# Patient Record
Sex: FEMALE | Race: WHITE | NOT HISPANIC OR LATINO | URBAN - METROPOLITAN AREA
[De-identification: names, ages, dates, MRNs, and addresses within clinical notes are randomized per-mention and may not be internally consistent; named-entity substitution may affect disease eponyms.]

---
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
## 2019-07-24 ENCOUNTER — OFFICE VISIT - RIVER FALLS (OUTPATIENT)
Dept: FAMILY MEDICINE | Facility: CLINIC | Age: 19
End: 2019-07-24

## 2019-07-24 ASSESSMENT — MIFFLIN-ST. JEOR: SCORE: 1485.03

## 2019-08-21 ENCOUNTER — OFFICE VISIT - RIVER FALLS (OUTPATIENT)
Dept: FAMILY MEDICINE | Facility: CLINIC | Age: 19
End: 2019-08-21

## 2019-08-21 ASSESSMENT — MIFFLIN-ST. JEOR: SCORE: 1483.21

## 2020-02-25 ENCOUNTER — OFFICE VISIT - RIVER FALLS (OUTPATIENT)
Dept: FAMILY MEDICINE | Facility: CLINIC | Age: 20
End: 2020-02-25

## 2020-03-05 ENCOUNTER — COMMUNICATION - RIVER FALLS (OUTPATIENT)
Dept: FAMILY MEDICINE | Facility: CLINIC | Age: 20
End: 2020-03-05

## 2020-03-10 ENCOUNTER — OFFICE VISIT - RIVER FALLS (OUTPATIENT)
Dept: FAMILY MEDICINE | Facility: CLINIC | Age: 20
End: 2020-03-10

## 2020-04-29 ENCOUNTER — OFFICE VISIT - RIVER FALLS (OUTPATIENT)
Dept: FAMILY MEDICINE | Facility: CLINIC | Age: 20
End: 2020-04-29

## 2020-10-06 ENCOUNTER — OFFICE VISIT - RIVER FALLS (OUTPATIENT)
Dept: FAMILY MEDICINE | Facility: CLINIC | Age: 20
End: 2020-10-06

## 2021-06-28 ENCOUNTER — OFFICE VISIT - RIVER FALLS (OUTPATIENT)
Dept: FAMILY MEDICINE | Facility: CLINIC | Age: 21
End: 2021-06-28

## 2021-06-28 ASSESSMENT — MIFFLIN-ST. JEOR: SCORE: 1497.73

## 2021-10-25 ENCOUNTER — OFFICE VISIT - RIVER FALLS (OUTPATIENT)
Dept: FAMILY MEDICINE | Facility: CLINIC | Age: 21
End: 2021-10-25

## 2021-10-25 ASSESSMENT — MIFFLIN-ST. JEOR: SCORE: 1513.15

## 2022-02-11 VITALS
HEART RATE: 75 BPM | SYSTOLIC BLOOD PRESSURE: 118 MMHG | OXYGEN SATURATION: 99 % | DIASTOLIC BLOOD PRESSURE: 68 MMHG | WEIGHT: 165.2 LBS | TEMPERATURE: 98.7 F | BODY MASS INDEX: 26.55 KG/M2 | HEIGHT: 66 IN

## 2022-02-11 VITALS
HEART RATE: 68 BPM | HEIGHT: 66 IN | WEIGHT: 158.6 LBS | TEMPERATURE: 97.3 F | HEIGHT: 66 IN | TEMPERATURE: 96.1 F | BODY MASS INDEX: 25.55 KG/M2 | SYSTOLIC BLOOD PRESSURE: 112 MMHG | WEIGHT: 159 LBS | BODY MASS INDEX: 25.49 KG/M2 | HEART RATE: 64 BPM | DIASTOLIC BLOOD PRESSURE: 62 MMHG | SYSTOLIC BLOOD PRESSURE: 118 MMHG | DIASTOLIC BLOOD PRESSURE: 62 MMHG

## 2022-02-11 VITALS
OXYGEN SATURATION: 98 % | TEMPERATURE: 98 F | TEMPERATURE: 98 F | SYSTOLIC BLOOD PRESSURE: 106 MMHG | HEART RATE: 70 BPM | WEIGHT: 162.2 LBS | BODY MASS INDEX: 26.58 KG/M2 | SYSTOLIC BLOOD PRESSURE: 126 MMHG | OXYGEN SATURATION: 98 % | DIASTOLIC BLOOD PRESSURE: 66 MMHG | WEIGHT: 164.2 LBS | BODY MASS INDEX: 26.91 KG/M2 | HEART RATE: 76 BPM | DIASTOLIC BLOOD PRESSURE: 62 MMHG

## 2022-02-11 VITALS
WEIGHT: 152.4 LBS | HEART RATE: 88 BPM | BODY MASS INDEX: 24.49 KG/M2 | DIASTOLIC BLOOD PRESSURE: 76 MMHG | TEMPERATURE: 99.3 F | HEIGHT: 66 IN | SYSTOLIC BLOOD PRESSURE: 118 MMHG

## 2022-02-11 VITALS
TEMPERATURE: 97.4 F | OXYGEN SATURATION: 98 % | WEIGHT: 161.8 LBS | DIASTOLIC BLOOD PRESSURE: 64 MMHG | HEART RATE: 82 BPM | SYSTOLIC BLOOD PRESSURE: 108 MMHG | BODY MASS INDEX: 26 KG/M2 | HEIGHT: 66 IN

## 2022-02-16 NOTE — PROGRESS NOTES
Patient:   NAYANA MCCONNELL            MRN: [id number]            FIN: [id number]               Age:   20 years     Sex:  Female     :  2000   Associated Diagnoses:   BLAKE (generalized anxiety disorder); Severe major depression   Author:   Antonia Hunt      Visit Information      Date of Service: 03/10/2020 12:19 pm  Performing Location: [facility]  Encounter#: [id number]      Primary Care Provider (PCP):  NONE ,       Referring Provider:  Antonia Hunt    NPI# [id number]      Chief Complaint   3/10/2020 12:23 PM CDT   FMLA paperwork for mom in cause she needs to be with her daughter for depression/anxiety        History of Present Illness   here with mom, Geovanna, to complete FMLA paper work.  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 She saw me 2020 after her mom had gone out to [place] to be with her and bring her home. Mom missed a number of work days due to Nayana's risk of harm to herself and her employer advised that she have FMLA completed in case she needs additional time off.  Nayana is now back on medication , has doubled the dose I started her on and has additional increased dose for next month. She is starting to feel better, would call mom and seek friends if started to think again about hurting self. Nayana currently is living in , mom is in [place]    See copy of [place]      Health Status   Allergies:    Allergic Reactions (Selected)  No known allergies  No Known Medication Allergies   Medications:  (Selected)   Prescriptions  Prescribed  Lexapro 20 mg oral tablet: = 1 tab(s) ( 20 mg ), Oral, daily, Instructions: start after first month of starter dose, # 30 tab(s), 0 Refill(s), Type: Maintenance, Pharmacy: [facility] #[id number], 1 tab(s) Oral daily,Instr:start after first month of starter dose  escitalopram 10 mg oral tablet: See Instructions, Instructions:  TAKE ONE-HALF TABLET BY MOUTH EVERY DAY FOR SEVEN DAYS, THEN TAKE ONE TABLET BY MOUTH EVERY DAY., # 22 EA, 1 Refill(s), Type: Soft Stop, Pharmacy: [facility] #[id number], TAKE ONE-HALF TABLET BY MOUTH EVERY DAY FOR...  Documented Medications  Documented  Flovent HFA: inh, bid, 0 Refill(s), Type: Maintenance  albuterol: 0 Refill(s), Type: Maintenance   Problem list:    No problem items selected or recorded.      Histories   Past Medical History:    No active or resolved past medical history items have been selected or recorded.   Family History:    No family history items have been selected or recorded.   Procedure history:    No active procedure history items have been selected or recorded.   Social History:        Alcohol Assessment            Never      Tobacco Assessment            Never (less than 100 in lifetime)      Substance Abuse Assessment            Never      Nutrition and Health Assessment            Type of diet: Regular.      Exercise and Physical Activity Assessment            Exercise frequency: 3-4 times/week.  Exercise type: Running.      Sexual Assessment            Sexually active: No.  Identifies as female, Sexual orientation: Straight or heterosexual.        Physical Examination   Vital Signs   3/10/2020 12:23 PM CDT Temperature Tympanic 98.0 DegF    Peripheral Pulse Rate 76 bpm    Systolic Blood Pressure 106 mmHg    Diastolic Blood Pressure 62 mmHg    Mean Arterial Pressure 77 mmHg    BP Site Right arm    BP Method Manual    Oxygen Saturation 98 %      Measurements from flowsheet : Measurements   3/10/2020 12:23 PM CDT   Weight Measured - Standard                164.2 lb     General:  Alert and oriented, No acute distress, good eye contact, smiling.       Impression and Plan   Diagnosis     BLAKE (generalized anxiety disorder) (DDH38-GX F41.1).     Severe major depression (LOS94-BY F32.2).     Plan:  see me in 4 weeks, after increased dose of current SSRI  paperwork completed for mom, over  30 min in counseling and care coordination.    Patient Instructions:       Counseled: Patient, Regarding diagnosis, Regarding treatment, Regarding medications, Verbalized understanding.

## 2022-02-16 NOTE — NURSING NOTE
CAGE Assessment Entered On:  2/25/2020 1:08 PM CST    Performed On:  2/25/2020 1:07 PM CST by Irma Roberts MA               Assessment   Have you ever felt you should cut down on your drinking :   No   Have people annoyed you by criticizing your drinking :   No   Have you ever felt bad or guilty about your drinking :   No   Have you ever taken a drink first thing in the morning to steady your nerves or get rid of a hangover (Eye-opener) :   No   CAGE Score :   0    Irma Roberts MA - 2/25/2020 1:07 PM CST

## 2022-02-16 NOTE — NURSING NOTE
Generalized Anxiety Disorder Screening Entered On:  6/29/2021 10:54 AM CDT    Performed On:  6/29/2021 10:53 AM CDT by Nataliia Adkins LPN               BLAKE-7   BLAKE Nervous, Anxious On Edge :   More than half the days   BLAKE Control Worrying B :   More than half the days   BLAKE Worrying Too Much :   Several days   BLAKE Trouble Relaxing :   Several days   BLAKE Restless :   Not at all   BLAKE Easily Annoyed/Irritable :   Not at all   BLAKE Afraid :   Not at all   BLAKE Total Screening Score :   6    BLAKE Difficulty with Work, Home, Others :   Not difficult at all   Nataliia Adkins LPN - 6/29/2021 10:53 AM CDT

## 2022-02-16 NOTE — NURSING NOTE
Depression Screening Entered On:  10/25/2021 1:17 PM CDT    Performed On:  10/25/2021 1:17 PM CDT by Nataliia Adkins LPN               Depression Screening   Little Interest - Pleasure in Activities :   Not at all   Feeling Down, Depressed, Hopeless :   Several days   Initial Depression Screen Score :   1 Score   Poor Appetite or Overeating :   Not at all   Trouble Falling or Staying Asleep :   Several days   Feeling Tired or Little Energy :   Several days   Feeling Bad About Yourself :   Not at all   Trouble Concentrating :   Not at all   Moving or Speaking Slowly :   Not at all   Thoughts Better Off Dead or Hurting Self :   Not at all   Difficulty at Work, Home, Getting Along :   Somewhat difficult   Detailed Depression Screen Score :   2    Total Depression Screen Score :   3    Nataliia Adkins LPN - 10/25/2021 1:17 PM CDT

## 2022-02-16 NOTE — PROGRESS NOTES
Patient:   NAYANA MCCONNELL            MRN: [id number]            FIN: [id number]               Age:   21 years     Sex:  Female     :  2000   Associated Diagnoses:   BLAKE (generalized anxiety disorder); Severe major depression   Author:   Antonia Hunt      Visit Information      Date of Service: 2021 05:03 pm  Performing Location: [facility]  Encounter#: [id number]      Primary Care Provider (PCP):  NONE ,       Referring Provider:  Anotnia Hunt    NPI# [id number]      Chief Complaint   2021 5:27 PM CDT    requesting a letter to approve an emotional support animal, a cat, for her apartment        History of Present Illness     2020  here with mom, Geovanna, to complete FMLA paper work.  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 She saw me 2020 after her mom had gone out to [place] to be with her and bring her home. Mom missed a number of work days due to Nayana's risk of harm to herself and her employer advised that she have FMLA completed in case she needs additional time off.  Nayana is now back on medication , has doubled the dose I started her on and has additional increased dose for next month. She is starting to feel better, would call mom and seek friends if started to think again about hurting self. 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 She really enjoys work and feels she is doing much better. PHQ 9 is down from last visit, believes LExapro is at a good dose. Sometimes gets more down with rainy whether but not actually thinking about hurting self, Would like to continue this dose    2020  back at [employer], living off campus with friends, senior year  Lexapro is working well  feels less  anxious, able to cope with COVID and school stressors, would like to continue    6/28/2021  doing well on same dose of Lexapro  graduated [employer] and now living in [place] with friends till a rental home in [place]  opens up. SHe requests a noted to the landlord hoping she can have her cat PIP live with her as an CHRISTINA, see note      Health Status   Allergies:    Allergic Reactions (Selected)  No known allergies  No Known Medication Allergies   Medications:  (Selected)   Prescriptions  Prescribed  escitalopram 20 mg oral tablet: = 1 tab(s), Oral, daily, # 90 tab(s), 3 Refill(s), Type: Maintenance, Pharmacy: [facility], 1 tab(s) Oral daily, 65.5, in, 08/21/19 7:56:00 CDT, Height Measured, 164.2, lb, 03/10/20 12:23:00 CDT, Weight Measured  Documented Medications  Documented  Flovent HFA: inh, bid, 0 Refill(s), Type: Maintenance  albuterol: 0 Refill(s), Type: Maintenance,    Medications          *denotes recorded medication          *albuterol: 0 Refill(s).          escitalopram 20 mg oral tablet: 1 tab(s), Oral, daily, 90 tab(s), 3 Refill(s).          *Flovent HFA: inh, bid, 0 Refill(s).       Problem list:    All Problems  BLAKE (generalized anxiety disorder) / SNOMED CT 75048117 / Confirmed  Severe major depression / SNOMED CT 8144329315 / Confirmed      Histories   Past Medical History:    No active or resolved past medical history items have been selected or recorded.   Family History:    No family history items have been selected or recorded.   Procedure history:    No active procedure history items have been selected or recorded.   Social History:        Electronic Cigarette/Vaping Assessment            Electronic Cigarette Use: Never.      Alcohol Assessment            Never      Tobacco Assessment            Never (less than 100 in lifetime)      Substance Abuse Assessment            Never      Nutrition and Health Assessment            Type of diet: Regular.      Exercise and Physical Activity  Assessment            Exercise frequency: 3-4 times/week.  Exercise type: Running.      Sexual Assessment            Sexually active: No.  Identifies as female, Sexual orientation: Straight or heterosexual.        Physical Examination   VS/Measurements   General:  Alert and oriented, No acute distress.       Impression and Plan   Diagnosis     BLAKE (generalized anxiety disorder) (WLT35-CM F41.1).     Severe major depression (RDI37-CG F32.2).     Course:  Improving.    Patient Instructions:       Counseled: Patient, Regarding diagnosis, Regarding treatment, Regarding medications, Verbalized understanding, see letter written on pt ed, she will  at  when contacted.

## 2022-02-16 NOTE — NURSING NOTE
Comprehensive Intake Entered On:  3/10/2020 12:26 PM CDT    Performed On:  3/10/2020 12:23 PM CDT by Nataliia Adkins LPN               Summary   Chief Complaint :   FMLA paperwork for mom in cause she needs to be with her daughter for depression/anxiety   Weight Measured :   164.2 lb(Converted to: 164 lb 3 oz, 74.48 kg)    Systolic Blood Pressure :   106 mmHg   Diastolic Blood Pressure :   62 mmHg   Mean Arterial Pressure :   77 mmHg   Peripheral Pulse Rate :   76 bpm   BP Site :   Right arm   BP Method :   Manual   Temperature Tympanic :   98.0 DegF(Converted to: 36.7 DegC)    Oxygen Saturation :   98 %   Nataliia Adkins LPN - 3/10/2020 12:23 PM CDT   Health Status   Allergies Verified? :   Yes   Medication History Verified? :   Yes   Medical History Verified? :   No   Pre-Visit Planning Status :   Completed   Tobacco Use? :   Never smoker   Nataliia Adkins LPN - 3/10/2020 12:23 PM CDT   Meds / Allergies   (As Of: 3/10/2020 12:26:46 PM CDT)   Allergies (Active)   No known allergies  Estimated Onset Date:   Unspecified ; Created By:   Nataliia Adkins LPN; Reaction Status:   Active ; Category:   Drug ; Substance:   No known allergies ; Type:   Allergy ; Updated By:   Nataliia Adkins LPN; Reviewed Date:   2/25/2020 12:26 PM CST      No Known Medication Allergies  Estimated Onset Date:   Unspecified ; Created By:   Bebe Villegas RN; Reaction Status:   Active ; Category:   Drug ; Substance:   No Known Medication Allergies ; Type:   Allergy ; Updated By:   Bebe Villegas RN; Reviewed Date:   7/24/2019 8:30 AM CDT        Medication List   (As Of: 3/10/2020 12:26:46 PM CDT)   Prescription/Discharge Order    escitalopram  :   escitalopram ; Status:   Prescribed ; Ordered As Mnemonic:   Lexapro 20 mg oral tablet ; Simple Display Line:   20 mg, 1 tab(s), Oral, daily, start after first month of starter dose, 30 tab(s), 0 Refill(s) ; Ordering Provider:   Antonia Hunt; Catalog Code:   escitalopram ; Order  Dt/Tm:   2/25/2020 12:53:27 PM CST          escitalopram  :   escitalopram ; Status:   Prescribed ; Ordered As Mnemonic:   escitalopram 10 mg oral tablet ; Simple Display Line:   See Instructions, TAKE ONE-HALF TABLET BY MOUTH EVERY DAY FOR SEVEN DAYS, THEN TAKE ONE TABLET BY MOUTH EVERY DAY., 22 EA, 1 Refill(s) ; Ordering Provider:   Antonia Hunt; Catalog Code:   escitalopram ; Order Dt/Tm:   2/25/2020 12:51:38 PM CST            Home Meds    albuterol  :   albuterol ; Status:   Documented ; Ordered As Mnemonic:   albuterol ; Simple Display Line:   0 Refill(s) ; Catalog Code:   albuterol ; Order Dt/Tm:   4/11/2018 1:30:27 PM CDT          fluticasone  :   fluticasone ; Status:   Documented ; Ordered As Mnemonic:   Flovent HFA ; Simple Display Line:   inh, bid, 0 Refill(s) ; Catalog Code:   fluticasone ; Order Dt/Tm:   4/11/2018 1:30:47 PM CDT

## 2022-02-16 NOTE — NURSING NOTE
Generalized Anxiety Disorder Screening Entered On:  10/25/2021 1:17 PM CDT    Performed On:  10/25/2021 1:16 PM CDT by Nataliia Adkins LPN               BLAKE-7   BALKE Nervous, Anxious On Edge :   More than half the days   BLAKE Control Worrying B :   Several days   BLAKE Worrying Too Much :   Several days   BLAKE Trouble Relaxing :   Several days   BLAKE Restless :   Several days   BLAKE Easily Annoyed/Irritable :   Not at all   BLAKE Afraid :   Not at all   BLAKE Total Screening Score :   6    BLAKE Difficulty with Work, Home, Others :   Not difficult at all   Nataliia Adkins LPN - 10/25/2021 1:16 PM CDT

## 2022-02-16 NOTE — NURSING NOTE
CAGE Assessment Entered On:  8/21/2019 8:16 AM CDT    Performed On:  8/21/2019 8:16 AM CDT by Irma Roberts MA               Assessment   Have you ever felt you should cut down on your drinking :   No   Have people annoyed you by criticizing your drinking :   No   Have you ever felt bad or guilty about your drinking :   No   Have you ever taken a drink first thing in the morning to steady your nerves or get rid of a hangover (Eye-opener) :   No   CAGE Score :   0    Irma Roberts MA - 8/21/2019 8:16 AM CDT

## 2022-02-16 NOTE — NURSING NOTE
Comprehensive Intake Entered On:  2019 8:00 AM CDT    Performed On:  2019 7:56 AM CDT by Armando HOOPER, Irma               Summary   Chief Complaint :   f/u anxiety.   Weight Measured :   158.6 lb(Converted to: 158 lb 10 oz, 71.94 kg)    Height Measured :   65.5 in(Converted to: 5 ft 5 in, 166.37 cm)    Body Mass Index :   25.99 kg/m2 (HI)    Body Surface Area :   1.82 m2   Systolic Blood Pressure :   112 mmHg   Diastolic Blood Pressure :   62 mmHg   Mean Arterial Pressure :   79 mmHg   Peripheral Pulse Rate :   68 bpm   BP Site :   Right arm   Pulse Site :   Radial artery   BP Method :   Manual   HR Method :   Manual   Temperature Tympanic :   96.1 DegF(Converted to: 35.6 DegC)  (LOW)    Irma Roberts MA - 2019 7:56 AM CDT   Health Status   Allergies Verified? :   Yes   Medication History Verified? :   Yes   Medical History Verified? :   Yes   Pre-Visit Planning Status :   Completed   Tobacco Use? :   Never smoker   Irma Roberts MA - 2019 7:56 AM CDT   Demographics   Last Name :   Jude   Address :   [address] [place] [address]   First Name :   Nayana   Responsible Party Date of Birth () :   2000 CST   City :   [place]   State :   [place]   Zip Code :   [address]   Irma Roberts MA - 2019 7:56 AM CDT   Consents   Consent for Immunization Exchange :   Consent Granted   Consent for Immunizations to Providers :   Consent Granted   Irma Roberts MA - 2019 7:56 AM CDT   Meds / Allergies   (As Of: 2019 8:00:35 AM CDT)   Allergies (Active)   No Known Medication Allergies  Estimated Onset Date:   Unspecified ; Created By:   Bebe Villegas; Reaction Status:   Active ; Category:   Drug ; Substance:   No Known Medication Allergies ; Type:   Allergy ; Updated By:   Bebe Villegas; Reviewed Date:   2019 8:30 AM CDT        Medication List   (As Of: 2019 8:00:35 AM CDT)   Prescription/Discharge Order    escitalopram 10 mg oral tablet  :   escitalopram 10 mg oral tablet  ; Status:   Prescribed ; Ordered As Mnemonic:   escitalopram 10 mg oral tablet ; Simple Display Line:   See Instructions, TAKE ONE-HALF TABLET BY MOUTH EVERY DAY FOR SEVEN DAYS, THEN TAKE ONE TABLET BY MOUTH EVERY DAY., 90 tab(s), 1 Refill(s) ; Ordering Provider:   Ashutosh Christiansen MD; Catalog Code:   escitalopram ; Order Dt/Tm:   7/24/2019 10:53:56 AM            Home Meds    albuterol  :   albuterol ; Status:   Documented ; Ordered As Mnemonic:   albuterol ; Simple Display Line:   0 Refill(s) ; Catalog Code:   albuterol ; Order Dt/Tm:   4/11/2018 1:30:27 PM          fluticasone  :   fluticasone ; Status:   Documented ; Ordered As Mnemonic:   Flovent HFA ; Simple Display Line:   inh, bid, 0 Refill(s) ; Catalog Code:   fluticasone ; Order Dt/Tm:   4/11/2018 1:30:47 PM            Social History   Social History   (As Of: 8/21/2019 8:00:35 AM CDT)   Alcohol:        Never   (Last Updated: 4/14/2018 1:04:23 PM CDT by Rosa Ham)          Tobacco:        Never (less than 100 in lifetime)   (Last Updated: 4/17/2018 11:08:03 AM CDT by Rosa Ham)          Substance Abuse:        Never   (Last Updated: 4/17/2018 11:08:07 AM CDT by Rosa Ham)          Nutrition/Health:        Type of diet: Regular.   (Last Updated: 4/17/2018 11:08:21 AM CDT by Rosa Ham)          Exercise:        Exercise frequency: 3-4 times/week.  Exercise type: Running.   (Last Updated: 4/17/2018 11:08:29 AM CDT by Rosa Ham)          Sexual:        Sexually active: No.  Identifies as female, Sexual orientation: Straight or heterosexual.   (Last Updated: 4/17/2018 11:08:40 AM CDT by Rosa Ham)

## 2022-02-16 NOTE — NURSING NOTE
CAGE Assessment Entered On:  6/29/2021 10:53 AM CDT    Performed On:  6/29/2021 10:53 AM CDT by Nataliia Adkins LPN               Assessment   Have you ever felt you should cut down on your drinking :   No   Have people annoyed you by criticizing your drinking :   No   Have you ever felt bad or guilty about your drinking :   No   Have you ever taken a drink first thing in the morning to steady your nerves or get rid of a hangover (Eye-opener) :   No   CAGE Score :   0    Nataliia Adkins LPN - 6/29/2021 10:53 AM CDT

## 2022-02-16 NOTE — TELEPHONE ENCOUNTER
Entered by Irma Roberts MA on August 10, 2020 9:32:27 AM CDT  ---------------------  From: Irma Roberts MA   To: [facility]    Sent: 8/10/2020 9:32:27 AM CDT  Subject: Medication Management     ** Not Approved: Patient needs appointment, Pt now due for f/u med check for any further refills **  escitalopram (ESCITALOPRAM  20MG  TAB)  TAKE 1 TABLET BY MOUTH  DAILY  Qty:  90 tab(s)        Days Supply:  90        Refills:  3          Substitutions Allowed     Route To Pharmacy - [facility]   Note from Pharmacy:  Requesting 1 year supply  Signed by Irma Roberts MA            ------------------------------------------  From: [facility]  To: Antonia Hunt  Sent: August 8, 2020 5:27:58 AM CDT  Subject: Medication Management  Due: July 28, 2020 10:29:52 PM CDT     ** On Hold Pending Signature **     Drug: escitalopram (escitalopram 20 mg oral tablet), TAKE 1 TABLET BY MOUTH DAILY  Quantity: 90 tab(s)  Days Supply: 90  Refills: 0  Substitutions Allowed  Notes from Pharmacy: - First Attempt Ref: [id number]     Dispensed Drug: escitalopram (escitalopram 20 mg oral tablet), TAKE 1 TABLET BY MOUTH DAILY  Quantity: 90 tab(s)  Days Supply: 90  Refills: 3  Substitutions Allowed  Notes from Pharmacy: Requesting 1 year supply  ------------------------------------------

## 2022-02-16 NOTE — TELEPHONE ENCOUNTER
---------------------  From: Wendy Delgadillo LPN (Phone Messages Pool ([id number]))   Sent: 6/3/2020 9:29:02 AM CDT  Subject: refill     Phone Message    PCP:   asked for NCB      Time of Call:  9:11am       Person Calling:  pt  Phone number:  [phone]    Returned call at: 9:22am    Note:   Pt LM requesting Rx refill of escitalopram.    Returned call and informed pt Rx was sent 4/29/20 for #90. Pt says she only had 30 tabs (Rx from 4/21/20).    Pt will contact [facility] to have Rx filled.    Last office visit and reason:  4/29/20 Telephone Encounter

## 2022-02-16 NOTE — NURSING NOTE
Comprehensive Intake Entered On:  7/24/2019 8:31 AM CDT    Performed On:  7/24/2019 8:26 AM CDT by Rachel Goncalves LPN               Summary   Chief Complaint :   anxiety and depression concerns.    Weight Measured :   159 lb(Converted to: 159 lb 0 oz, 72.12 kg)    Height Measured :   65.5 in(Converted to: 5 ft 5 in, 166.37 cm)    Body Mass Index :   26.05 kg/m2 (HI)    Body Surface Area :   1.82 m2   Systolic Blood Pressure :   118 mmHg   Diastolic Blood Pressure :   62 mmHg   Mean Arterial Pressure :   81 mmHg   Peripheral Pulse Rate :   64 bpm   BP Site :   Right arm   Pulse Site :   Radial artery   BP Method :   Electronic   HR Method :   Manual   Temperature Tympanic :   97.3 DegF(Converted to: 36.3 DegC)  (LOW)    Rachel Goncalves LPN - 7/24/2019 8:26 AM CDT   Health Status   Allergies Verified? :   Yes   Medication History Verified? :   Yes   Pre-Visit Planning Status :   Completed   Tobacco Use? :   Never smoker   Rachel Goncalves LPN - 7/24/2019 8:26 AM CDT   Consents   Consent for Immunization Exchange :   Consent Granted   Consent for Immunizations to Providers :   Consent Granted   Rachel Goncalves LPN - 7/24/2019 8:26 AM CDT   Meds / Allergies   (As Of: 7/24/2019 8:31:42 AM CDT)   Allergies (Active)   No Known Medication Allergies  Estimated Onset Date:   Unspecified ; Created By:   Bebe Villegas RN; Reaction Status:   Active ; Category:   Drug ; Substance:   No Known Medication Allergies ; Type:   Allergy ; Updated By:   Bebe Villegas RN; Reviewed Date:   7/24/2019 8:30 AM CDT        Medication List   (As Of: 7/24/2019 8:31:42 AM CDT)   Prescription/Discharge Order    naproxen  :   naproxen ; Status:   Processing ; Ordered As Mnemonic:   Naprosyn 500 mg oral tablet ; Ordering Provider:   Ashutosh Harris MD; Action Display:   Complete ; Catalog Code:   naproxen ; Order Dt/Tm:   7/24/2019 8:29:59 AM            Home Meds    fluticasone  :   fluticasone ; Status:   Documented ; Ordered As  Mnemonic:   Flovent HFA ; Simple Display Line:   inh, bid, 0 Refill(s) ; Catalog Code:   fluticasone ; Order Dt/Tm:   4/11/2018 1:30:47 PM          albuterol  :   albuterol ; Status:   Documented ; Ordered As Mnemonic:   albuterol ; Simple Display Line:   0 Refill(s) ; Catalog Code:   albuterol ; Order Dt/Tm:   4/11/2018 1:30:27 PM

## 2022-02-16 NOTE — LETTER
(Inserted Image. Unable to display)     July 06, 2020      DAVY MCCONNELL  [address] [place], [place] [id number]          Dear DAVY,      Thank you for selecting [facility].      Our records indicate you are due for the following services:     Follow-up office visit.      To schedule an appointment or if you have further questions, please contact your primary clinic:   [facility]       [phone]   Critical access hospital       [phone]              [facility]     [phone]      Powered by [employer]    Sincerely,    SUKUMAR ClaireNP

## 2022-02-16 NOTE — NURSING NOTE
Generalized Anxiety Disorder Screening Entered On:  8/21/2019 8:17 AM CDT    Performed On:  8/21/2019 8:16 AM CDT by Armando HOOPER, Irma               Generalized Anxiety Disorder Screening   BLAKE Nervous, Anxious On Edge :   Several days   BLAKE Control Worrying B :   Several days   BLAKE Worrying Too Much :   Several days   BLAKE Restless :   Several days   BLAKE Easily Annoyed/Irritable :   Not at all   BLAKE Afraid :   Not at all   BLAKE Trouble Relaxing :   Several days   BLAKE Total Screening Score :   5    BLAKE Difficulty with Work, Home, Others :   Not difficult at all   Armando HOOPER, Irma - 8/21/2019 8:16 AM CDT

## 2022-02-16 NOTE — PROGRESS NOTES
Patient:   NAYANA MCCONNELL            MRN: [id number]            FIN: [id number]               Age:   20 years     Sex:  Female     :  2000   Associated Diagnoses:   BLAKE (generalized anxiety disorder)   Author:   Antonia Hunt      Visit Information      Date of Service: 10/06/2020 06:38 am  Performing Location: [facility]  Encounter#: [id number]      Primary Care Provider (PCP):  NONE ,       Referring Provider:  Antonia Hunt    NPI# [id number]   Visit type:  video.    Participants in room during visit:  _pt   Location of patient:  _home at [facility]  Location of provider:  _ clinic  Video Start Time:  738a  Video End Time:   _743 a    Today's visit was conducted via video conference due to the COVID-19 pandemic.  The patient's consent to proceed with a video visit has been obtained and documented.      Chief Complaint   10/6/2020 7:20 AM CDT    needs a refill on Citalopram - verbal consent for video visit on smartphone        History of Present Illness   Patient is a _20 year old _female who is being evaluated via a billable video visit.     2020  here with mom, Geovanna, to complete FMLA paper work.  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 She saw me 2020 after her mom had gone out to [place] to be with her and bring her home. Mom missed a number of work days due to Nayana's risk of harm to herself and her employer advised that she have FMLA completed in case she needs additional time off.  Nayana is now back on medication , has doubled the dose I started her on and has additional increased dose for next month. She is starting to feel better, would call mom and seek friends if started to think again about hurting self. 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 She really enjoys work and feels she is doing much better. PHQ 9 is down from last visit, believes LExapro is at a good dose. Sometimes gets more down with rainy whether but not actually thinking about hurting self, Would like to continue this dose    October 6, 2020  back at [employer], living off campus with friends, senior year  Lexapro is working well  feels less anxious, able to cope with COVID and school stressors, would like to continue            Health Status   Allergies:    Allergic Reactions (Selected)  No known allergies  No Known Medication Allergies   Medications:  (Selected)   Prescriptions  Prescribed  escitalopram 20 mg oral tablet: = 1 tab(s), Oral, daily, # 90 tab(s), 3 Refill(s), Type: Maintenance, Pharmacy: [facility], 1 tab(s) Oral daily, 65.5, in, 08/21/19 7:56:00 CDT, Height Measured, 164.2, lb, 03/10/20 12:23:00 CDT, Weight Measured  Documented Medications  Documented  Flovent HFA: inh, bid, 0 Refill(s), Type: Maintenance  albuterol: 0 Refill(s), Type: Maintenance,    Medications          *denotes recorded medication          *albuterol: 0 Refill(s).          escitalopram 20 mg oral tablet: 1 tab(s), Oral, daily, 90 tab(s), 3 Refill(s).          *Flovent HFA: inh, bid, 0 Refill(s).       Problem list:    All Problems  BLAKE (generalized anxiety disorder) / SNOMED CT 82762807 / Confirmed  Severe major depression / SNOMED CT 5024118223 / Confirmed      Histories   Past Medical History:    No active or resolved past medical history items have been selected or recorded.   Family History:    No family history items have been selected or recorded.   Procedure history:    No active procedure history items have been selected or recorded.   Social History:        Alcohol Assessment            Never      Tobacco Assessment            Never (less than 100 in lifetime)      Substance Abuse Assessment            Never      Nutrition and Health Assessment             Type of diet: Regular.      Exercise and Physical Activity Assessment            Exercise frequency: 3-4 times/week.  Exercise type: Running.      Sexual Assessment            Sexually active: No.  Identifies as female, Sexual orientation: Straight or heterosexual.        Physical Examination   General:  Alert and oriented, No acute distress.    Eye:  Normal conjunctiva.    Respiratory:  Respirations are non-labored.    Psychiatric:  Cooperative, Appropriate mood & affect, Normal judgment.       Impression and Plan   Diagnosis     BLAKE (generalized anxiety disorder) (QCI67-RF F41.1).     Course:  Progressing as expected.    Patient Instructions:       Counseled: Patient.    Orders     Orders (Selected)   Prescriptions  Prescribed  escitalopram 20 mg oral tablet: = 1 tab(s), Oral, daily, # 90 tab(s), 3 Refill(s), Type: Maintenance, Pharmacy: [facility], 1 tab(s) Oral daily, 65.5, in, 08/21/19 7:56:00 CDT, Height Measured, 164.2, lb, 03/10/20 12:23:00 CDT, Weight Measured.

## 2022-02-16 NOTE — NURSING NOTE
CAGE Assessment Entered On:  4/29/2020 4:15 PM CDT    Performed On:  4/29/2020 4:15 PM CDT by Nataliia Adkins LPN               Assessment   Have you ever felt you should cut down on your drinking :   No   Have people annoyed you by criticizing your drinking :   No   Have you ever felt bad or guilty about your drinking :   No   Have you ever taken a drink first thing in the morning to steady your nerves or get rid of a hangover (Eye-opener) :   No   CAGE Score :   0    Nataliia Adkins LPN - 4/29/2020 4:15 PM CDT

## 2022-02-16 NOTE — TELEPHONE ENCOUNTER
---------------------  From: Nataliia Adkins LPN (Phone Messages Pool ([id number]_[facility]))   To: Phone Messages Pool ([id number]_[place]);     Sent: 3/5/2020 8:27:43 AM CST  Subject: FMLA        0820 Call placed to patient - LM on personalized VM asking for a call back.  We received some FMLA paperwork to be completed on patient for her mother. I don't see that the mother has ever been seen in the clinic. Per [facility], both mother and daughter would need to schedule an appt to discuss the reasoning for the FMLA. I will leave the paperwork in [facility]'s box until we hear back.I see that patient now has an appt scheduled for 3/10/20.

## 2022-02-16 NOTE — NURSING NOTE
Depression Screening Entered On:  7/24/2019 6:26 PM CDT    Performed On:  7/24/2019 6:26 PM CDT by Luiz Huber CMA               Depression Screening   Little Interest - Pleasure in Activities :   Several days   Feeling Down, Depressed, Hopeless :   Several days   Initial Depression Screen Score :   2    Trouble Falling or Staying Asleep :   Nearly every day   Feeling Tired or Little Energy :   Several days   Poor Appetite or Overeating :   Not at all   Feeling Bad About Yourself :   Several days   Trouble Concentrating :   Not at all   Moving or Speaking Slowly :   More than half the days   Thoughts Better Off Dead or Hurting Self :   Not at all   Detailed Depression Screen Score :   7    Total Depression Screen Score :   9    BLAKE Difficulty with Work, Home, Others :   Not difficult at all   Luiz Huber CMA - 7/24/2019 6:26 PM CDT

## 2022-02-16 NOTE — PROGRESS NOTES
Chief Complaint    f/u anxiety.  History of Present Illness      Patient is here for her anxiety follow up.  She was last seen 7/24/19 for issues with anxiety.  She was put on Escitalopram 10mg daily and it seems to be helping with her symptoms.  She doesn't feel as stressed out, not having much for panic attacks, feels more relaxed.  Is still going to counseling.  She will be returning to school this fall at [employer], will be participating in intramural sports.  Review of Systems           See HPI.  All other review of systems negative.              Physical Exam   Vitals & Measurements    T: 96.1   F (Tympanic)  HR: 68(Peripheral)  BP: 112/62     HT: 65.5 in  WT: 158.6 lb  BMI: 25.99           General:  Alert and oriented, No acute distress.            Eye:  Pupils are equal, round and reactive to light, Normal conjunctiva.            Musculoskeletal:  Normal gait.            Integumentary:  Warm, No rash.            Psychiatric:  Cooperative, Appropriate mood & affect, Normal judgment.          Patient s BLAKE, PHQ-9 and CAGE questionnaire reviewed and discussed with patient.   PHQ-9=4  BLAKE=5.  Assessment/Plan       1. Anxiety with depression (F41.8)         Improving.  Continue with current medication, call if she feels dose needs to be adjusted later on.  RTC 2 months for follow up.      IIrma MA, acted solely as a scribe for, and in the presence of Dr. Ashutosh Christiansen who performed the services.             I, Ashutosh Christiansen MD, personally performed the services described in this documentation.  The documentation was scribed in my presence and is both accurate and complete.                Patient Information     Name:DAVY MCCONNELL      Address:      [address] [place] [id number]     Sex:Female     YOB: 2000     [phone]     Emergency Contact:NANCY PERAZA     MRN:[id number]     FIN:[id number]     Location:[facility]      Date of Service:08/21/2019      Primary Care Physician:       NONE ,       Attending Physician:       Ashutosh Christiansen MD, [phone]  Problem List/Past Medical History    Ongoing     No qualifying data    Historical     No qualifying data  Medications    albuterol    escitalopram 10 mg oral tablet, See Instructions, 1 refills    Flovent HFA, Inhale, bid  Allergies    No Known Medication Allergies  Social History    Smoking Status - 08/21/2019     Never smoker     Alcohol      Never, 04/14/2018     Exercise      Exercise frequency: 3-4 times/week. Exercise type: Running., 04/17/2018     Nutrition/Health      Type of diet: Regular., 04/17/2018     Sexual      Sexually active: No. Identifies as female, Sexual orientation: Straight or heterosexual., 04/17/2018     Substance Abuse      Never, 04/17/2018     Tobacco      Never (less than 100 in lifetime), 04/17/2018  Immunizations      Vaccine Date Status      influenza virus vaccine, inactivated 10/18/2016 Recorded      meningococcal conjugate vaccine 08/14/2015 Recorded      human papillomavirus vaccine 06/28/2011 Recorded      Td 02/13/2011 Recorded      human papillomavirus vaccine 01/25/2011 Recorded      meningococcal conjugate vaccine 01/25/2011 Recorded      tetanus/diphth/pertuss (Tdap) adult/adol 10/12/2010 Recorded      human papillomavirus vaccine 10/12/2010 Recorded      influenza virus vaccine, inactivated 10/12/2010 Recorded      varicella 01/09/2009 Recorded      influenza virus vaccine, inactivated 01/09/2009 Recorded      MMR (measles/mumps/rubella) 07/21/2003 Recorded      IPV 07/21/2003 Recorded      DTaP 07/21/2003 Recorded      varicella 02/13/2001 Recorded      pneumococcal 02/13/2001 Recorded      DTaP 08/30/2000 Recorded      MMR (measles/mumps/rubella) 05/24/2000 Recorded      Hep B-Hib 02/15/2000 Recorded      OPV 1999 Recorded      DTaP 1999 Recorded      rotavirus vaccine 1999 Recorded      Hep B-Hib 1999  Recorded      OPV 1999 Recorded      DTaP 1999 Recorded      Hep B-Hib 1999 Recorded      OPV 1999 Recorded      DTaP 1999 Recorded

## 2022-02-16 NOTE — NURSING NOTE
Comprehensive Intake Entered On:  4/29/2020 4:14 PM CDT    Performed On:  4/29/2020 4:08 PM CDT by Nataliia Adkins LPN               Summary   Chief Complaint :   f/u on anxiety and depression, says that things have been going good, PHQ9=4 - verbal consent for video visit on mobile device   Nataliia Adkins LPN - 4/29/2020 4:08 PM CDT   Health Status   Allergies Verified? :   Yes   Medication History Verified? :   Yes   Medical History Verified? :   No   Pre-Visit Planning Status :   Not completed   Tobacco Use? :   Never smoker   Nataliia Adkins LPN - 4/29/2020 4:08 PM CDT   Meds / Allergies   (As Of: 4/29/2020 4:14:52 PM CDT)   Allergies (Active)   No known allergies  Estimated Onset Date:   Unspecified ; Created By:   Nataliia Adkins LPN; Reaction Status:   Active ; Category:   Drug ; Substance:   No known allergies ; Type:   Allergy ; Updated By:   Nataliia Adkins LPN; Reviewed Date:   2/25/2020 12:26 PM CST      No Known Medication Allergies  Estimated Onset Date:   Unspecified ; Created By:   Bebe Villegas; Reaction Status:   Active ; Category:   Drug ; Substance:   No Known Medication Allergies ; Type:   Allergy ; Updated By:   Bebe Villegas; Reviewed Date:   7/24/2019 8:30 AM CDT        Medication List   (As Of: 4/29/2020 4:14:52 PM CDT)   Prescription/Discharge Order    escitalopram  :   escitalopram ; Status:   Prescribed ; Ordered As Mnemonic:   escitalopram 20 mg oral tablet ; Simple Display Line:   1 tab(s), Oral, daily, 30 tab(s), 0 Refill(s) ; Ordering Provider:   Antonia Hunt; Catalog Code:   escitalopram ; Order Dt/Tm:   4/21/2020 11:46:31 AM CDT            Home Meds    albuterol  :   albuterol ; Status:   Documented ; Ordered As Mnemonic:   albuterol ; Simple Display Line:   0 Refill(s) ; Catalog Code:   albuterol ; Order Dt/Tm:   4/11/2018 1:30:27 PM CDT          fluticasone  :   fluticasone ; Status:   Documented ; Ordered As Mnemonic:   Flovent HFA ; Simple Display Line:    inh, bid, 0 Refill(s) ; Catalog Code:   fluticasone ; Order Dt/Tm:   4/11/2018 1:30:47 PM CDT            ID Risk Screen   Recent Travel History :   No recent travel   Family Member Travel History :   No recent travel   Other Exposure to Infectious Disease :   Unknown   Nataliia Adkins LPN - 4/29/2020 4:08 PM CDT

## 2022-02-16 NOTE — NURSING NOTE
Generalized Anxiety Disorder Screening Entered On:  2/25/2020 1:09 PM CST    Performed On:  2/25/2020 1:07 PM CST by Armando HOOPER, Irma               Generalized Anxiety Disorder Screening   BLAKE Nervous, Anxious On Edge :   Nearly every day   BLAKE Control Worrying B :   More than half the days   BLAKE Worrying Too Much :   Nearly every day   BLAKE Trouble Relaxing :   Nearly every day   BLAKE Restless :   More than half the days   BLAKE Easily Annoyed/Irritable :   More than half the days   BLAKE Afraid :   Several days   BLAKE Total Screening Score :   16    BLAKE Difficulty with Work, Home, Others :   Very difficult   Armando HOOPER, Irma - 2/25/2020 1:07 PM CST

## 2022-02-16 NOTE — NURSING NOTE
Depression Screening Entered On:  2/25/2020 1:08 PM CST    Performed On:  2/25/2020 1:07 PM CST by Armando HOOPER, Irma               Depression Screening   Little Interest - Pleasure in Activities :   More than half the days   Feeling Down, Depressed, Hopeless :   Nearly every day   Initial Depression Screen Score :   5    Trouble Falling or Staying Asleep :   Several days   Feeling Tired or Little Energy :   More than half the days   Poor Appetite or Overeating :   Nearly every day   Feeling Bad About Yourself :   Several days   Trouble Concentrating :   Several days   Moving or Speaking Slowly :   Several days   Thoughts Better Off Dead or Hurting Self :   Several days   Detailed Depression Screen Score :   10    Total Depression Screen Score :   15    BLAKE Difficulty with Work, Home, Others :   Very difficult   Armando HOOPER, Irma - 2/25/2020 1:07 PM CST

## 2022-02-16 NOTE — NURSING NOTE
Comprehensive Intake Entered On:  2/25/2020 12:28 PM CST    Performed On:  2/25/2020 12:25 PM CST by Nataliia Adkins LPN               Summary   Chief Complaint :   here to discuss depression - not currently taking anything, has in the past   Weight Measured :   162.2 lb(Converted to: 162 lb 3 oz, 73.57 kg)    Systolic Blood Pressure :   126 mmHg   Diastolic Blood Pressure :   66 mmHg   Mean Arterial Pressure :   86 mmHg   Peripheral Pulse Rate :   70 bpm   BP Site :   Right arm   BP Method :   Manual   Temperature Tympanic :   98.0 DegF(Converted to: 36.7 DegC)    Oxygen Saturation :   98 %   Nataliia Adkins LPN - 2/25/2020 12:25 PM CST   Health Status   Allergies Verified? :   Yes   Medication History Verified? :   Yes   Medical History Verified? :   No   Pre-Visit Planning Status :   Completed   Tobacco Use? :   Never smoker   Nataliia Adkins LPN - 2/25/2020 12:25 PM CST   Meds / Allergies   (As Of: 2/25/2020 12:28:54 PM CST)   Allergies (Active)   No known allergies  Estimated Onset Date:   Unspecified ; Created By:   Nataliia Adkins LPN; Reaction Status:   Active ; Category:   Drug ; Substance:   No known allergies ; Type:   Allergy ; Updated By:   Nataliia Adkins LPN; Reviewed Date:   2/25/2020 12:26 PM CST      No Known Medication Allergies  Estimated Onset Date:   Unspecified ; Created By:   Bebe Villegas RN; Reaction Status:   Active ; Category:   Drug ; Substance:   No Known Medication Allergies ; Type:   Allergy ; Updated By:   Bebe Villegas RN; Reviewed Date:   7/24/2019 8:30 AM CDT        Medication List   (As Of: 2/25/2020 12:28:54 PM CST)   Prescription/Discharge Order    escitalopram 10 mg oral tablet  :   escitalopram 10 mg oral tablet ; Status:   Prescribed ; Ordered As Mnemonic:   escitalopram 10 mg oral tablet ; Simple Display Line:   See Instructions, TAKE ONE-HALF TABLET BY MOUTH EVERY DAY FOR SEVEN DAYS, THEN TAKE ONE TABLET BY MOUTH EVERY DAY., 90 tab(s), 1 Refill(s) ;  Ordering Provider:   Ashutosh Christiansen MD; Catalog Code:   escitalopram ; Order Dt/Tm:   7/24/2019 10:53:56 AM CDT            Home Meds    albuterol  :   albuterol ; Status:   Documented ; Ordered As Mnemonic:   albuterol ; Simple Display Line:   0 Refill(s) ; Catalog Code:   albuterol ; Order Dt/Tm:   4/11/2018 1:30:27 PM CDT          fluticasone  :   fluticasone ; Status:   Documented ; Ordered As Mnemonic:   Flovent HFA ; Simple Display Line:   inh, bid, 0 Refill(s) ; Catalog Code:   fluticasone ; Order Dt/Tm:   4/11/2018 1:30:47 PM CDT

## 2022-02-16 NOTE — PROGRESS NOTES
Patient:   DAVY MCCONNELL            MRN: [id number]            FIN: [id number]               Age:   20 years     Sex:  Female     :  2000   Associated Diagnoses:   BLAKE (generalized anxiety disorder)   Author:   Antonia Hunt      Visit Information      Date of Service: 2020 12:17 pm  Performing Location: [facility]  Encounter#: [id number]      Primary Care Provider (PCP):  NONE ,       Referring Provider:  Antonia Hunt    NPI# [id number]      Chief Complaint   2020 12:25 PM CST   here to discuss depression - not currently taking anything, has in the past        History of Present Illness   here to restart Lexapro and maybe try a higher dose  ran out 2 months ago, she was at 10mg  it helped with anxiety that now is building again, see BLAKE 7 score  she had no side effects  Also willing to boost tdap today  [facility] nikolas      Health Status   Allergies:    Allergic Reactions (Selected)  No known allergies  No Known Medication Allergies   Medications:  (Selected)   Prescriptions  Prescribed  Lexapro 20 mg oral tablet: = 1 tab(s) ( 20 mg ), Oral, daily, Instructions: start after first month of starter dose, # 30 tab(s), 0 Refill(s), Type: Maintenance, Pharmacy: [facility] #[id number], 1 tab(s) Oral daily,Instr:start after first month of starter dose  escitalopram 10 mg oral tablet: See Instructions, Instructions: TAKE ONE-HALF TABLET BY MOUTH EVERY DAY FOR SEVEN DAYS, THEN TAKE ONE TABLET BY MOUTH EVERY DAY., # 22 EA, 1 Refill(s), Type: Soft Stop, Pharmacy: [facility] #[id number], TAKE ONE-HALF TABLET BY MOUTH EVERY DAY FOR...  Documented Medications  Documented  Flovent HFA: inh, bid, 0 Refill(s), Type: Maintenance  albuterol: 0 Refill(s), Type: Maintenance   Problem list:    No problem items selected or recorded.      Histories   Past Medical History:    No active or resolved past medical history items have been selected or recorded.   Family History:    No  family history items have been selected or recorded.   Procedure history:    No active procedure history items have been selected or recorded.   Social History:        Alcohol Assessment            Never      Tobacco Assessment            Never (less than 100 in lifetime)      Substance Abuse Assessment            Never      Nutrition and Health Assessment            Type of diet: Regular.      Exercise and Physical Activity Assessment            Exercise frequency: 3-4 times/week.  Exercise type: Running.      Sexual Assessment            Sexually active: No.  Identifies as female, Sexual orientation: Straight or heterosexual.        Physical Examination   Vital Signs   2/25/2020 12:25 PM CST Temperature Tympanic 98.0 DegF    Peripheral Pulse Rate 70 bpm    Systolic Blood Pressure 126 mmHg    Diastolic Blood Pressure 66 mmHg    Mean Arterial Pressure 86 mmHg    BP Site Right arm    BP Method Manual    Oxygen Saturation 98 %      Measurements from flowsheet : Measurements   2/25/2020 12:25 PM CST   Weight Measured - Standard                162.2 lb     General:  Alert and oriented, No acute distress.       Impression and Plan   Diagnosis     BLAKE (generalized anxiety disorder) (TSW32-RT F41.1).     Patient Instructions:       Counseled: Patient, Regarding diagnosis, Regarding treatment, Regarding medications, Verbalized understanding, recheck in 2 months before you run out of medication  plan on min 6 months use  use/risk/benefits reviewed.    Orders     Orders (Selected)   Prescriptions  Prescribed  Lexapro 20 mg oral tablet: = 1 tab(s) ( 20 mg ), Oral, daily, Instructions: start after first month of starter dose, # 30 tab(s), 0 Refill(s), Type: Maintenance, Pharmacy: [facility] #[id number], 1 tab(s) Oral daily,Instr:start after first month of starter dose  escitalopram 10 mg oral tablet: See Instructions, Instructions: TAKE ONE-HALF TABLET BY MOUTH EVERY DAY FOR SEVEN DAYS, THEN TAKE ONE TABLET BY MOUTH EVERY  DAY., # 22 EA, 1 Refill(s), Type: Soft Stop, Pharmacy: [facility] #[id number], TAKE ONE-HALF TABLET BY MOUTH EVERY DAY FOR....

## 2022-02-16 NOTE — NURSING NOTE
CAGE Assessment Entered On:  10/25/2021 1:17 PM CDT    Performed On:  10/25/2021 1:17 PM CDT by Nataliia Adkins LPN               Assessment   Have you ever felt you should cut down on your drinking :   No   Have people annoyed you by criticizing your drinking :   No   Have you ever felt bad or guilty about your drinking :   No   Have you ever taken a drink first thing in the morning to steady your nerves or get rid of a hangover (Eye-opener) :   No   CAGE Score :   0    Nataliia Adkins LPN - 10/25/2021 1:17 PM CDT

## 2022-02-16 NOTE — NURSING NOTE
Comprehensive Intake Entered On:  10/25/2021 12:48 PM CDT    Performed On:  10/25/2021 12:44 PM CDT by Nataliia Adkins LPN               Summary   Chief Complaint :   med check/refills for anxiety/depression   Weight Measured :   165.2 lb(Converted to: 165 lb 3 oz, 74.933 kg)    Height Measured :   65.5 in(Converted to: 5 ft 5 in, 166.37 cm)    Body Mass Index :   27.07 kg/m2 (HI)    Body Surface Area :   1.86 m2   Systolic Blood Pressure :   118 mmHg   Diastolic Blood Pressure :   68 mmHg   Mean Arterial Pressure :   85 mmHg   Peripheral Pulse Rate :   75 bpm   BP Site :   Right arm   BP Method :   Manual   Temperature Tympanic :   98.7 DegF(Converted to: 37.1 DegC)    Oxygen Saturation :   99 %   Nataliia Adkins LPN - 10/25/2021 12:44 PM CDT   Health Status   Allergies Verified? :   Yes   Medication History Verified? :   Yes   Medical History Verified? :   No   Pre-Visit Planning Status :   Completed   Tobacco Use? :   Never smoker   Nataliia Adkins LPN - 10/25/2021 12:44 PM CDT   Meds / Allergies   (As Of: 10/25/2021 12:48:01 PM CDT)   Allergies (Active)   No known allergies  Estimated Onset Date:   Unspecified ; Created By:   Nataliia Adkins LPN; Reaction Status:   Active ; Category:   Drug ; Substance:   No known allergies ; Type:   Allergy ; Updated By:   Nataliia Adkins LPN; Reviewed Date:   2/25/2020 12:26 PM CST      No Known Medication Allergies  Estimated Onset Date:   Unspecified ; Created By:   Bebe Villegas; Reaction Status:   Active ; Category:   Drug ; Substance:   No Known Medication Allergies ; Type:   Allergy ; Updated By:   Bebe Villegas; Reviewed Date:   7/24/2019 8:30 AM CDT        Medication List   (As Of: 10/25/2021 12:48:01 PM CDT)   Prescription/Discharge Order    escitalopram  :   escitalopram ; Status:   Prescribed ; Ordered As Mnemonic:   escitalopram 20 mg oral tablet ; Simple Display Line:   1 tab(s), Oral, daily, 30 tab(s), 0 Refill(s) ; Ordering Provider:   Romario  Antonia DEE; Catalog Code:   escitalopram ; Order Dt/Tm:   10/4/2021 2:49:44 PM CDT            Home Meds    albuterol  :   albuterol ; Status:   Documented ; Ordered As Mnemonic:   albuterol ; Simple Display Line:   inhaler - prn use, 0 Refill(s) ; Catalog Code:   albuterol ; Order Dt/Tm:   4/11/2018 1:30:27 PM CDT

## 2022-02-16 NOTE — TELEPHONE ENCOUNTER
---------------------  From: Wendy Delgadillo LPN (Phone Messages Pool ([id number]_[facility]))   Sent: 6/18/2021 8:29:15 AM CDT  Subject: CHRISTINA letter     Phone Message    PCP:   none      Time of Call:  8:25am       Person Calling:  pt    Note:   Pt calling asking if [facility] will write a letter for an CHRISTINA animal. Told pt she will need to schedule an appointment to discuss.    Transferred to scheduling.    Last office visit and reason:  10/6/20 Video Visit

## 2022-02-16 NOTE — NURSING NOTE
Generalized Anxiety Disorder Screening Entered On:  7/24/2019 6:27 PM CDT    Performed On:  7/24/2019 6:26 PM CDT by Luiz Huber CMA               Generalized Anxiety Disorder Screening   BLAKE Nervous, Anxious On Edge :   More than half the days   BLAKE Control Worrying B :   Nearly every day   BLAKE Worrying Too Much :   Nearly every day   BLAKE Restless :   More than half the days   BLAKE Easily Annoyed/Irritable :   Several days   BLAKE Afraid :   Several days   BLAKE Trouble Relaxing :   More than half the days   BLAKE Total Screening Score :   14    BLAKE Difficulty with Work, Home, Others :   Somewhat difficult   Luiz Huber CMA - 7/24/2019 6:26 PM CDT

## 2022-02-16 NOTE — LETTER
(Inserted Image. Unable to display)   October 22, 2019        DAVY MCCONNELL  [address], [place] [id number]        Dear DAVY,      Thank you for selecting [facility] (previously [facility]) for your healthcare needs.    Our records indicate you are due for the following services:     Follow-up office visit    To schedule an appointment or if you have further questions, please contact your primary clinic:   [facility]       [phone]   [facility]       [phone]              [facility]     [phone]      Powered by [employer]    Sincerely,    Ashutosh Christiansen M.D.

## 2022-02-16 NOTE — NURSING NOTE
Depression Screening Entered On:  4/29/2020 4:15 PM CDT    Performed On:  4/29/2020 4:15 PM CDT by Nataliia Adkins LPN               Depression Screening   Little Interest - Pleasure in Activities :   Not at all   Feeling Down, Depressed, Hopeless :   Several days   Initial Depression Screen Score :   1    Poor Appetite or Overeating :   Not at all   Trouble Falling or Staying Asleep :   Several days   Feeling Tired or Little Energy :   Several days   Feeling Bad About Yourself :   Not at all   Trouble Concentrating :   Not at all   Moving or Speaking Slowly :   Not at all   Thoughts Better Off Dead or Hurting Self :   Several days   Detailed Depression Screen Score :   3    Total Depression Screen Score :   4    Nataliia Adkins LPN - 4/29/2020 4:15 PM CDT

## 2022-02-16 NOTE — PROGRESS NOTES
Chief Complaint    Left knee pain. Is a runner training for half marathon.  History of Present Illness      Started training in January. Two weeks ago did a longer run (11 miles) and noticed pain in left lateral hip for a couple days and then resolved. Began to have pain on left lateral knee three days ago. Knee does not catch, lock or give out. Pain with running and using stairs. No pain with walking. Starts with beginning of running. Denies trauma. Notices it after sitting in class. Hurts with bending.  Review of Systems       No weakness in leg       No numbness or tingling.       [facility] Freshman from [employer]  Physical Exam   Vitals & Measurements    T: 99.3(Tympanic)  HR: 88(Peripheral)  BP: 118/76     HT: 65.5 in  WT: 152.4 lb  BMI: 24.97       General: No acute distress      Musculoskeletal: Normal gait.  Left knee with Lachman s, anterior drawer, Trini s and varus with valgus stress testing are negative. Minimally positive Noble compression test. Negative Ismael test       Skin: No bruising or swelling.  No joint effusion  Assessment/Plan   Left knee pain: Possibly tendinitis.  Will work on strengthening the knee muscles and discussed strengthening exercises.  Start Naprosyn twice daily with food.  If not improving will go to physical therapy.  We will do some cross training and follow-up if not improving.  Patient Information     Name:DAVY MCCONNELL      Address:      [address] [place] [id number]     Sex:Female     YOB: 2000     [phone]     MRN:[id number]     FIN:[id number]     Location:[facility]     Date of Service:04/11/2018      Primary Care Physician:       NONE ,   Problem List/Past Medical History    Ongoing     No qualifying data    Historical  Medications        albuterol: 0 Refill(s).        Flovent HFA: inh, bid, 0 Refill(s).                Allergies    No Known Medication Allergies  Social  History    Smoking Status - 04/11/2018     Never smoker

## 2022-02-16 NOTE — PROGRESS NOTES
Patient:   NAYANA MCCONNELL            MRN: [id number]            FIN: [id number]               Age:   20 years     Sex:  Female     :  2000   Associated Diagnoses:   BLAKE (generalized anxiety disorder); Severe major depression   Author:   Antonia Hunt      Visit Information      Date of Service: 2020 09:16 am  Performing Location: [facility]  Encounter#: [id number]      Primary Care Provider (PCP):  NONE ,       Referring Provider:  Antonia Hunt    NPI# [id number]   Visit type:  Telephone Encounter.    Source of history:  Patient.    Location of patient:  _home  Call Start Time:   _4:20  Call End Time:    _4:42      Chief Complaint   2020 4:08 PM CDT    f/u on anxiety and depression, says that things have been going good, PHQ9=4 - verbal consent for video visit on mobile device   _      History of Present Illness   Today's visit was conducted via video due to the COVID-19 pandemic. Patient's consent to video visit was obtained and documented.      Reason for visit:   2020  here with mom, Geovanna, to complete FMLA paper work.  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 She saw me 2020 after her [place] had gone out to [place] to be with her and bring her home. Mom missed a number of work days due to Nayana's risk of harm to herself and her employer advised that she have FMLA completed in case she needs additional time off.  Nayana is now back on medication , has doubled the dose I started her on and has additional increased dose for next month. She is starting to feel better, would call mom and seek friends if started to think again about hurting self. 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 She really enjoys work  and feels she is doing much better. PHQ 9 is down from last visit, believes LExapro is at a good dose. Sometimes gets more down with rainy whether but not actually thinking about hurting self, Would like to continue this dose            Impression and Plan   Diagnosis     BLAKE (generalized anxiety disorder) (UQY08-KR F41.1).     Severe major depression (BUN72-GN F32.2).     Course:  Improving.    Patient Instructions:       Counseled: Patient.    Orders     Orders (Selected)   Prescriptions  Prescribed  escitalopram 20 mg oral tablet: = 1 tab(s), Oral, daily, # 90 tab(s), 0 Refill(s), Type: Maintenance, Pharmacy: [facility] #[id number], 1 tab(s) Oral daily.     continue med and counseling and video visit in 3 months.        Health Status   Allergies:    Allergic Reactions (Selected)  No known allergies  No Known Medication Allergies   Medications:  (Selected)   Prescriptions  Prescribed  escitalopram 20 mg oral tablet: = 1 tab(s), Oral, daily, # 90 tab(s), 0 Refill(s), Type: Maintenance, Pharmacy: [facility] #[id number], 1 tab(s) Oral daily  Documented Medications  Documented  Flovent HFA: inh, bid, 0 Refill(s), Type: Maintenance  albuterol: 0 Refill(s), Type: Maintenance   Problem list:    All Problems  BLAKE (generalized anxiety disorder) / SNOMED CT 98140314 / Confirmed  Severe major depression / SNOMED CT 4708381957 / Confirmed      Histories   Past Medical History:    No active or resolved past medical history items have been selected or recorded.   Family History:    No family history items have been selected or recorded.   Procedure history:    No active procedure history items have been selected or recorded.   Social History:        Alcohol Assessment            Never      Tobacco Assessment            Never (less than 100 in lifetime)      Substance Abuse Assessment            Never      Nutrition and Health Assessment            Type of diet: Regular.      Exercise and Physical Activity Assessment             Exercise frequency: 3-4 times/week.  Exercise type: Running.      Sexual Assessment            Sexually active: No.  Identifies as female, Sexual orientation: Straight or heterosexual.        Physical Examination   General:  Alert and oriented, No acute distress, smiling.    Psychiatric:  Cooperative, Appropriate mood & affect, Normal judgment, Non-suicidal.

## 2022-02-16 NOTE — TELEPHONE ENCOUNTER
---------------------  From: Cely Renteria LPN   Sent: 6/15/2020 9:39:54 AM CDT  Subject: med refill     PCP:   _  none seeing SAIMA    Time of Call:  _ 0900       Person Calling:  _ pt  Phone number:  _ [phone]    Returned call at: _ 0920    Note:   _ Pt requesting med refill be sent to mail order per insurance request. Called [facility] and had them cancel the escitalopram and [facility].    Last office visit and reason:  _

## 2022-02-16 NOTE — PROGRESS NOTES
Patient:   NAYANA MCCONNELL            MRN: [id number]            FIN: [id number]               Age:   21 years     Sex:  Female     :  2000   Associated Diagnoses:   Severe major depression   Author:   Antonia Hunt      Visit Information      Date of Service: 10/25/2021 12:33 pm  Performing Location: [facility]  Encounter#: [id number]      Chief Complaint   10/25/2021 12:44 PM CDT  med check/refills for anxiety/depression        History of Present Illness     PHQ and CAGE scoring reviewed with pt    2020  here with mom, Geovanna, to complete FMLA paper work.  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 She saw me 2020 after her [place] had gone out to [place] to be with her and bring her home. Mom missed a number of work days due to Nayana's risk of harm to herself and her employer advised that she have FMLA completed in case she needs additional time off.  Nayana is now back on medication , has doubled the dose I started her on and has additional increased dose for next month. She is starting to feel better, would call mom and seek friends if started to think again about hurting self. 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 PHQ 9 is down from last visit, believes LExapro is at a good dose. Sometimes gets more down with rainy whether but not actually thinking about hurting self, Would like to continue this dose    2020  back at [employer], living off campus with friends, senior year  Lexapro is working well  feels less anxious, able to cope with COVID and school stressors, would like to continue    2021  doing well on same dose of Lexapro  graduated [employer] and now  living in [place] with friends till a rental home in [place]  opens up. SHe requests a noted to the [facility] hoping she can have her cat PIP live with her as an CHRISTINA, see note      10/25/2021  Graduated [employer] and now working in [place] as a  with a .  Likes her work and living situation  would like to continue same dose Lexapro  low PHQ9 and GAD7 scores  would like flu shot, is COVID vaccinated      Review of Systems   All other systems.     Health Status   Allergies:    Allergic Reactions (Selected)  No known allergies  No Known Medication Allergies   Medications:  (Selected)   Prescriptions  Prescribed  escitalopram 20 mg oral tablet: = 1 tab(s), Oral, daily, # 30 tab(s), 5 Refill(s), Type: Maintenance, Pharmacy: [facility] #[id number], 1 tab(s) Oral daily, 65.5, in, 10/25/21 12:44:00 CDT, Height Measured, 165.2, lb, 10/25/21 12:44:00 CDT, Weight Measured  Documented Medications  Documented  albuterol: Instructions: inhaler - prn use, 0 Refill(s), Type: Maintenance   Problem list:    All Problems  Severe major depression / SNOMED CT 9998543540 / Confirmed  BLAKE (generalized anxiety disorder) / SNOMED CT 82947948 / Confirmed      Histories   Past Medical History:    No active or resolved past medical history items have been selected or recorded.   Family History:    No family history items have been selected or recorded.   Procedure history:    No active procedure history items have been selected or recorded.   Social History:        Electronic Cigarette/Vaping Assessment            Electronic Cigarette Use: Never.      Alcohol Assessment            Never      Tobacco Assessment            Never (less than 100 in lifetime)      Substance Abuse Assessment            Never      Nutrition and Health Assessment            Type of diet: Regular.      Exercise and Physical Activity Assessment            Exercise frequency: 3-4 times/week.  Exercise type: Running.      Sexual Assessment             Sexually active: No.  Identifies as female, Sexual orientation: Straight or heterosexual.        Physical Examination   Vital Signs   10/25/2021 12:44 PM CDT Temperature Tympanic 98.7 DegF    Peripheral Pulse Rate 75 bpm    Systolic Blood Pressure 118 mmHg    Diastolic Blood Pressure 68 mmHg    Mean Arterial Pressure 85 mmHg    BP Site Right arm    BP Method Manual    Oxygen Saturation 99 %      Measurements from flowsheet : Measurements   10/25/2021 12:44 PM CDT Height Measured - Standard 65.5 in    Weight Measured - Standard 165.2 lb    BSA 1.86 m2    Body Mass Index 27.07 kg/m2  HI      General:  Alert and oriented, No acute distress, good eye contact, engaging with conversation.    Cardiovascular:  Normal rate, Regular rhythm.    Psychiatric:  Cooperative, Appropriate mood & affect, Normal judgment, Non-suicidal.       Impression and Plan   Diagnosis     Severe major depression (WLT88-OG F32.2).     Course:  Progressing as expected.    Patient Instructions:  Lifestyle risk factors.         Counseled: Patient, Regarding diagnosis, Regarding treatment, Regarding medications, Diet, Activity, Verbalized understanding, will continue same dose and do video f/u in 6 months, sooner if needed  flu shot today.    Orders     Orders (Selected)   Prescriptions  Prescribed  escitalopram 20 mg oral tablet: = 1 tab(s), Oral, daily, # 30 tab(s), 5 Refill(s), Type: Maintenance, Pharmacy: [facility] #[id number], 1 tab(s) Oral daily, 65.5, in, 10/25/21 12:44:00 CDT, Height Measured, 165.2, lb, 10/25/21 12:44:00 CDT, Weight Measured.

## 2022-02-16 NOTE — NURSING NOTE
Comprehensive Intake Entered On:  10/6/2020 7:22 AM CDT    Performed On:  10/6/2020 7:20 AM CDT by Nataliia Adkins LPN               Summary   Chief Complaint :   needs a refill on Citalopram - verbal consent for video visit on smartphone   Nataliia Adkins LPN - 10/6/2020 7:20 AM CDT   Health Status   Allergies Verified? :   Yes   Medication History Verified? :   Yes   Medical History Verified? :   No   Pre-Visit Planning Status :   Completed   Tobacco Use? :   Never smoker   Nataliia Adkins LPN - 10/6/2020 7:20 AM CDT   Meds / Allergies   (As Of: 10/6/2020 7:22:42 AM CDT)   Allergies (Active)   No known allergies  Estimated Onset Date:   Unspecified ; Created By:   Naatliia Adkins LPN; Reaction Status:   Active ; Category:   Drug ; Substance:   No known allergies ; Type:   Allergy ; Updated By:   Nataliia Adkins LPN; Reviewed Date:   2/25/2020 12:26 PM CST      No Known Medication Allergies  Estimated Onset Date:   Unspecified ; Created By:   Bebe Villegas; Reaction Status:   Active ; Category:   Drug ; Substance:   No Known Medication Allergies ; Type:   Allergy ; Updated By:   Bebe Villegas; Reviewed Date:   7/24/2019 8:30 AM CDT        Medication List   (As Of: 10/6/2020 7:22:42 AM CDT)   Prescription/Discharge Order    escitalopram  :   escitalopram ; Status:   Prescribed ; Ordered As Mnemonic:   escitalopram 20 mg oral tablet ; Simple Display Line:   1 tab(s), Oral, daily, 90 tab(s), 0 Refill(s) ; Ordering Provider:   Antonia Hunt; Catalog Code:   escitalopram ; Order Dt/Tm:   6/15/2020 9:36:55 AM CDT            Home Meds    albuterol  :   albuterol ; Status:   Documented ; Ordered As Mnemonic:   albuterol ; Simple Display Line:   0 Refill(s) ; Catalog Code:   albuterol ; Order Dt/Tm:   4/11/2018 1:30:27 PM CDT          fluticasone  :   fluticasone ; Status:   Documented ; Ordered As Mnemonic:   Flovent HFA ; Simple Display Line:   inh, bid, 0 Refill(s) ; Catalog Code:   fluticasone ;  Order Dt/Tm:   4/11/2018 1:30:47 PM CDT            ID Risk Screen   Recent Travel History :   No recent travel   Family Member Travel History :   No recent travel   Other Exposure to Infectious Disease :   Unknown   Nataliia Adkins LPN - 10/6/2020 7:20 AM CDT

## 2022-02-16 NOTE — TELEPHONE ENCOUNTER
---------------------  From: Concetta Hester RN   Sent: 7/1/2021 3:39:04 PM CDT  Subject: General Message     Patient called to let SAIMA and Yashira know that she picked up her CHRISTINA letter in clinic.

## 2022-02-16 NOTE — TELEPHONE ENCOUNTER
Entered by Irma Roberts MA on April 21, 2020 11:47:14 AM CDT  ---------------------  From: Irma Roberts MA   To: [facility] #[id number]    Sent: 4/21/2020 11:47:13 AM CDT  Subject: Medication Management     ** Submitted: **  Order:escitalopram (escitalopram 20 mg oral tablet)  1 tab(s)  Oral  daily  Qty:  30 tab(s)        Days Supply:  30        Refills:  0          Substitutions Allowed     Route To [facility] [facility] #[id number]    Signed by Irma Roberts MA  4/21/2020 4:46:00 PM    ** Submitted: **  Complete:escitalopram (Lexapro 20 mg oral tablet)   Signed by Irma Roberts MA  4/21/2020 4:47:00 PM    ** Submitted: **  Complete:escitalopram (escitalopram 10 mg oral tablet)   Signed by Irma Roberts MA  4/21/2020 4:47:00 PM    ** Not Approved:  **  escitalopram (ESCITALOPRAM 20MG TABLETS)  TAKE 1 TABLET BY MOUTH DAILY. START AFTER FIRST MONTH OF STARTER DOSE  Qty:  30 tab(s)        Days Supply:  30        Refills:  0          Substitutions Allowed     Route To [facility] [facility] #[id number]   Signed by Irma Roberts MA            ------------------------------------------  From: [facility] #[id number]  To: Antonia Hunt  Sent: April 20, 2020 7:46:09 PM CDT  Subject: Medication Management  Due: April 21, 2020 7:46:09 PM CDT    ** On Hold Pending Signature **  Drug: escitalopram (escitalopram 20 mg oral tablet)  TAKE 1 TABLET BY MOUTH DAILY. START AFTER FIRST MONTH OF STARTER DOSE  Quantity: 30 tab(s)  Days Supply: 30  Refills: 0  Substitutions Allowed  Notes from Pharmacy:     Dispensed Drug: escitalopram (escitalopram 20 mg oral tablet)  TAKE 1 TABLET BY MOUTH DAILY. START AFTER FIRST MONTH OF STARTER DOSE  Quantity: 30 tab(s)  Days Supply: 30  Refills: 0  Substitutions Allowed  Notes from Pharmacy:   ------------------------------------------Med Refill      Date of last office visit and reason:  3/10/2020 w/ SAIMA for depression/BLAKE      Date of last Med Check / Px:    _  Date of last labs pertaining to med:  _    Note:  _    RTC order in chart:  RTC placed due now for f/u    For Protocol refill, has patient been contacted:  message sent to pharmacy

## 2022-02-16 NOTE — TELEPHONE ENCOUNTER
---------------------  From: Concetta Hester RN   Sent: 10/1/2020 11:17:47 AM CDT  Subject: Phone Message     Phone Message    PCP:   SAIMA      Time of Call:  1106       Person Calling:  Pt  Phone number:  [phone]    Returned call at: 1116    Note:   Pt called requesting refill of Escitalopram. Returned call and informed her that she is due for med check w/NCB. She expressed understanding and was transferred to schedule appointment.     Last office visit and reason:  4-29-20 w/NCB

## 2022-02-16 NOTE — NURSING NOTE
Depression Screening Entered On:  8/21/2019 8:17 AM CDT    Performed On:  8/21/2019 8:16 AM CDT by Armando HOOPER, Irma               Depression Screening   Little Interest - Pleasure in Activities :   Not at all   Feeling Down, Depressed, Hopeless :   Several days   Initial Depression Screen Score :   1    Trouble Falling or Staying Asleep :   Several days   Feeling Tired or Little Energy :   Several days   Poor Appetite or Overeating :   Not at all   Feeling Bad About Yourself :   Not at all   Trouble Concentrating :   Several days   Moving or Speaking Slowly :   Not at all   Thoughts Better Off Dead or Hurting Self :   Not at all   Detailed Depression Screen Score :   3    Total Depression Screen Score :   4    BLAKE Difficulty with Work, Home, Others :   Not difficult at all   Armando HOOPER, Irma - 8/21/2019 8:16 AM CDT

## 2022-02-16 NOTE — NURSING NOTE
Depression Screening Entered On:  6/29/2021 10:54 AM CDT    Performed On:  6/29/2021 10:53 AM CDT by Nataliia Adkins LPN               Depression Screening   Little Interest - Pleasure in Activities :   Not at all   Feeling Down, Depressed, Hopeless :   Several days   Initial Depression Screen Score :   1 Score   Poor Appetite or Overeating :   Not at all   Trouble Falling or Staying Asleep :   More than half the days   Feeling Tired or Little Energy :   Several days   Feeling Bad About Yourself :   Several days   Trouble Concentrating :   Not at all   Moving or Speaking Slowly :   Not at all   Thoughts Better Off Dead or Hurting Self :   Not at all   Difficulty at Work, Home, Getting Along :   Not difficult at all   Detailed Depression Screen Score :   4    Total Depression Screen Score :   5    Nataliia Adkins LPN - 6/29/2021 10:53 AM CDT

## 2022-02-16 NOTE — TELEPHONE ENCOUNTER
---------------------  From: Sabrina Viera ([facility] Pool ([id number]))   To: [facility] Pool ([id number]_[place] [facility] [place]);     Sent: 7/24/2019 9:40:41 AM CDT  Subject: FW: Medication Management   Due Date/Time: 7/25/2019 8:56:00 AM CDT     Pharmacy requesting 90 day supply.      ------------------------------------------  From: [facility] #[id number]  To: Ashutosh Christiansen MD  Sent: July 24, 2019 8:56:46 AM CDT  Subject: Medication Management  Due: July 25, 2019 8:56:46 AM CDT    ** On Hold Pending Signature **  Drug: escitalopram (escitalopram 10 mg oral tablet)  1 TAB(S) ORAL DAILY,INSTR:TAKE 0.5 TABLET FOR ONE WEEK THEN INCREASE TO 1 TABLET DAILY.  Quantity: 30 tab(s)     Days Supply: 0         Refills: 0  Substitutions Allowed  Notes from Pharmacy: **Patient requests 90 days supply**    Dispensed Drug: escitalopram (escitalopram 10 mg oral tablet)  TAKE ONE-HALF TABLET BY MOUTH EVERY DAY FOR SEVEN DAYS, THEN TAKE ONE TABLET BY MOUTH EVERY DAY.  Quantity: 90 tab(s)     Days Supply: 30        Refills: 1  Substitutions Allowed  Notes from Pharmacy: **Patient requests 90 days supply**  ---------------------------------------------------------------  From: Irma Roberts MA ([facility] [facility] Pool ([id number]))   To: Ashutosh Christiansen MD;     Sent: 7/24/2019 10:12:47 AM CDT  Subject: FW: Medication Management   Due Date/Time: 7/25/2019 8:56:00 AM CDT     Pt was seen earlier this AM.  Please advise.---------------------  From: Ashutosh Christiansen MD   To: [facility] #[id number]    Sent: 7/24/2019 10:53:57 AM CDT  Subject: FW: Medication Management     ** Submitted: **  Complete:escitalopram (escitalopram 10 mg oral tablet)   Signed by Ashutosh Christiansen MD  7/24/2019 10:53:00 AM    ** Approved **  escitalopram (ESCITALOPRAM 10MG TABLETS)  TAKE ONE-HALF TABLET BY MOUTH EVERY DAY FOR SEVEN DAYS, THEN TAKE ONE TABLET BY MOUTH EVERY DAY.  Qty:  90 tab(s)        Days Supply:  30        Refills:   1          Substitutions Allowed     Route To Pharmacy - [facility] #[id number]   Note from Pharmacy:  **Patient requests 90 days supply**

## 2022-02-16 NOTE — NURSING NOTE
CAGE Assessment Entered On:  7/24/2019 6:26 PM CDT    Performed On:  7/24/2019 6:26 PM CDT by Luiz Huber CMA               Assessment   Have you ever felt you should cut down on your drinking :   No   Have people annoyed you by criticizing your drinking :   No   Have you ever felt bad or guilty about your drinking :   No   Have you ever taken a drink first thing in the morning to steady your nerves or get rid of a hangover (Eye-opener) :   No   CAGE Score :   0    Luiz Huber CMA - 7/24/2019 6:26 PM CDT

## 2022-02-16 NOTE — PROGRESS NOTES
"Chief Complaint    anxiety and depression concerns.  History of Present Illness      Patient here with concerns with Depression and anxiety. Has noticed an increase in anxiety the past two weeks, can't sit still or focus on things. Worries about things next week and can not concentrate on current situations at work. Does have close friends that are struggling with depression. Is unable to fall asleep at night. Feels alone and unable to have positive times of the day. Has talked to a counselor at campus and they recommended to see a provider and to continue to see counselor ever other week.      Does have some feeling of wanting to escape from situations. Does feel that when reading has to read it twice to comprehend what was reading.      History of anxiety freshman year in collage after Volleyball ended, unable to focus and struggled in school.      Has a family history of anxiety and depression, mom and sister.  Review of Systems      \"See HPI.  All other review of systems negative.\"  Physical Exam   Vitals & Measurements    T: 97.3   F (Tympanic)  HR: 64(Peripheral)  BP: 118/62     HT: 65.5 in  WT: 159 lb  BMI: 26.05           General:  Alert and oriented, No acute distress.            Eye:  Normal conjunctiva.            HENT:  Oral mucosa is moist.            Neck:  Supple.            Respiratory:  Respirations are non-labored.            Cardiovascular:  Normal rate          Gastrointestinal:  Non-distended.            Musculoskeletal:  Normal gait.            Integumentary:  Warm, No rash.            Psychiatric:  Cooperative, Appropriate mood & affect, Normal judgment.           Patient s PHQ-9 (9) and BLAKE (14) questionnaire reviewed and discussed with patient.  Assessment/Plan       1. Anxiety with depression (F41.8)         Continue with counseling, start escitalopram 0.5 mg for one week then increase to 1 tablet daily. Follow up in one month. If medication makes tired can take medication at HS.        30 " minutes spent with patient, greater than 50% in counseling and care coordination.      I, Rachel Goncalves LPN, acted solely as a scribe for, and in the presence of Dr. Ashutosh Christiansen who performed the services.  Patient Information     Name:DAVY MCCONNELL      Address:      [address] [place] [id number]     Sex:Female     YOB: 2000     [phone]     Emergency Contact:NANCY PERAZA     MRN:[id number]     FIN:[id number]     Location:[facility]     Date of Service:07/24/2019      Primary Care Physician:       NONE ,       Attending Physician:       Ashutosh Christiansen MD, [phone]  Problem List/Past Medical History    Ongoing     No qualifying data    Historical     No qualifying data  Medications     albuterol: 0 Refill(s).     Flovent HFA: inh, bid, 0 Refill(s).     escitalopram 10 mg oral tablet: 10 mg, 1 tab(s), Oral, daily, Take 0.5 tablet for one week then increase to 1 tablet daily., 30 tab(s), 1 Refill(s).      Allergies    No Known Medication Allergies  Social History    Smoking Status - 07/24/2019     Never smoker     Alcohol      Never, 04/14/2018     Exercise      Exercise frequency: 3-4 times/week. Exercise type: Running., 04/17/2018     Nutrition/Health      Type of diet: Regular., 04/17/2018     Sexual      Sexually active: No. Identifies as female, Sexual orientation: Straight or heterosexual., 04/17/2018     Substance Abuse      Never, 04/17/2018     Tobacco      Never (less than 100 in lifetime), 04/17/2018  Immunizations      Vaccine Date Status      influenza virus vaccine, inactivated 10/18/2016 Recorded      meningococcal conjugate vaccine 08/14/2015 Recorded      human papillomavirus vaccine 06/28/2011 Recorded      Td 02/13/2011 Recorded      human papillomavirus vaccine 01/25/2011 Recorded      meningococcal conjugate vaccine 01/25/2011 Recorded      tetanus/diphth/pertuss (Tdap) adult/adol 10/12/2010  Recorded      human papillomavirus vaccine 10/12/2010 Recorded      influenza virus vaccine, inactivated 10/12/2010 Recorded      varicella 01/09/2009 Recorded      influenza virus vaccine, inactivated 01/09/2009 Recorded      MMR (measles/mumps/rubella) 07/21/2003 Recorded      IPV 07/21/2003 Recorded      DTaP 07/21/2003 Recorded      varicella 02/13/2001 Recorded      pneumococcal 02/13/2001 Recorded      DTaP 08/30/2000 Recorded      MMR (measles/mumps/rubella) 05/24/2000 Recorded      Hep B-Hib 02/15/2000 Recorded      OPV 1999 Recorded      DTaP 1999 Recorded      rotavirus vaccine 1999 Recorded      Hep B-Hib 1999 Recorded      OPV 1999 Recorded      DTaP 1999 Recorded      Hep B-Hib 1999 Recorded      OPV 1999 Recorded      DTaP 1999 Recorded

## 2022-02-16 NOTE — TELEPHONE ENCOUNTER
---------------------  From: Veronica Guzman CMA   To: [facility] Message Pool ([id number]_[facility]);     Sent: 11/22/2021 1:18:29 PM CST  Subject: Pharmacy change     PCP:   None, saw SAIMA      Time of Call:  1315       Person Calling:  Patient  Phone number:  [phone]    Returned call at: _    Note:   Patient called stating her mothers insurance has changed and is needing her Escitalopram resent to [facility]. Confirmed pharmacy through search and set as favorite. She is unsure at this time if she has to transfer care, but will be home over holidays and will discuss with her mother. Med last filled 10/25/21 x6mth to [facility]. Advise    Last office visit and reason:  10/25/21 Depression w/ [facility] and notified pt. No further questions or concerns at this time.

## 2022-06-27 DIAGNOSIS — F41.1 GENERALIZED ANXIETY DISORDER: Primary | ICD-10-CM

## 2022-06-29 RX ORDER — ESCITALOPRAM OXALATE 20 MG/1
TABLET ORAL
Qty: 30 TABLET | Refills: 0 | Status: SHIPPED | OUTPATIENT
Start: 2022-06-29

## 2022-06-29 NOTE — TELEPHONE ENCOUNTER
"Medication is being filled for 1 time refill only due to:  Patient needs to be seen:     TC - Please assist patient in scheduling an office visit.      Last Written Prescription Date:  11/22/2021  Last Fill Quantity: 30,  # refills: 4   Last office visit provider:  10/25/2021     Requested Prescriptions   Pending Prescriptions Disp Refills     escitalopram (LEXAPRO) 20 MG tablet [Pharmacy Med Name: ESCITALOPRAM OXALATE 20MG TABS] 30 tablet 4     Sig: TAKE ONE TABLET BY MOUTH ONCE DAILY       SSRIs Protocol Failed - 6/27/2022  9:55 AM        Failed - Recent (12 mo) or future (30 days) visit within the authorizing provider's specialty     Patient has had an office visit with the authorizing provider or a provider within the authorizing providers department within the previous 12 mos or has a future within next 30 days. See \"Patient Info\" tab in inbasket, or \"Choose Columns\" in Meds & Orders section of the refill encounter.              Failed - Medication is active on med list        Passed - Patient is age 18 or older        Passed - No active pregnancy on record        Passed - No positive pregnancy test in last 12 months             Beni Lew RN 06/29/22 8:30 AM  "

## 2022-08-16 DIAGNOSIS — F41.1 GENERALIZED ANXIETY DISORDER: ICD-10-CM

## 2022-08-16 NOTE — TELEPHONE ENCOUNTER
"Routing refill request to provider for review/approval because:  Lenore given x1 and patient did not follow up, please advise  Patient has been called and left voice message to RTC, no office visit scheduled.    Last Written Prescription Date:  6/29/2022  Last Fill Quantity: 30,  # refills: 0   Last office visit provider:  10/25/2021     Requested Prescriptions   Pending Prescriptions Disp Refills     escitalopram (LEXAPRO) 20 MG tablet [Pharmacy Med Name: ESCITALOPRAM OXALATE 20MG TABS] 30 tablet 0     Sig: TAKE 1 TABLET BY MOUTH 1 TIME A DAY       SSRIs Protocol Failed - 8/16/2022  9:17 AM        Failed - Recent (12 mo) or future (30 days) visit within the authorizing provider's specialty     Patient has had an office visit with the authorizing provider or a provider within the authorizing providers department within the previous 12 mos or has a future within next 30 days. See \"Patient Info\" tab in inbasket, or \"Choose Columns\" in Meds & Orders section of the refill encounter.              Passed - Medication is active on med list        Passed - Patient is age 18 or older        Passed - No active pregnancy on record        Passed - No positive pregnancy test in last 12 months             Beni Lew RN 08/16/22 1:01 PM  "

## 2022-09-07 RX ORDER — ESCITALOPRAM OXALATE 20 MG/1
TABLET ORAL
Qty: 30 TABLET | Refills: 0 | OUTPATIENT
Start: 2022-09-07

## 2022-09-07 NOTE — TELEPHONE ENCOUNTER
Patient scheduled 09/21, patient has enough of medication. Routed to RN's to simply DENY medication as clinic staff is unable too.

## 2022-12-17 ENCOUNTER — TELEPHONE (OUTPATIENT)
Dept: FAMILY MEDICINE | Facility: CLINIC | Age: 22
End: 2022-12-17

## 2022-12-17 NOTE — TELEPHONE ENCOUNTER
FYI - Status Update    Who is Calling: patient    Update: Pt needs another copy of CHRISTINA letter sent to their email    Email to: [email or web address]    Does caller want a call/response back: Yes     Okay to leave a detailed message?: Yes at Cell number on file:    Telephone Information:   Mobile [phone]

## 2022-12-19 NOTE — TELEPHONE ENCOUNTER
Not seeing this letter patient is requesting.  Routing to provider for review.    Dominik Sandhu LPN on 12/19/2022 at 1:41 PM

## 2022-12-19 NOTE — TELEPHONE ENCOUNTER
I haven't seen this pt nor dictated a letter that I can see since October 2021. You [facility] be [facility] can [facility] search [facility].
